# Patient Record
Sex: MALE | Race: WHITE
[De-identification: names, ages, dates, MRNs, and addresses within clinical notes are randomized per-mention and may not be internally consistent; named-entity substitution may affect disease eponyms.]

---

## 2021-11-29 ENCOUNTER — HOSPITAL ENCOUNTER (EMERGENCY)
Dept: HOSPITAL 56 - MW.ED | Age: 36
Discharge: HOME | End: 2021-11-29
Payer: SELF-PAY

## 2021-11-29 DIAGNOSIS — K29.00: Primary | ICD-10-CM

## 2021-11-29 DIAGNOSIS — Z20.822: ICD-10-CM

## 2021-11-29 DIAGNOSIS — R12: ICD-10-CM

## 2021-11-29 DIAGNOSIS — R11.2: ICD-10-CM

## 2021-11-29 DIAGNOSIS — R19.7: ICD-10-CM

## 2021-11-29 LAB
BUN SERPL-MCNC: 16 MG/DL (ref 7–18)
CHLORIDE SERPL-SCNC: 97 MMOL/L (ref 98–107)
CO2 SERPL-SCNC: 29.7 MMOL/L (ref 21–32)
FLUAV RNA UPPER RESP QL NAA+PROBE: NEGATIVE
FLUBV RNA UPPER RESP QL NAA+PROBE: NEGATIVE
GLUCOSE SERPL-MCNC: 147 MG/DL (ref 74–106)
POTASSIUM SERPL-SCNC: 3.5 MMOL/L (ref 3.5–5.1)
SARS-COV-2 RNA RESP QL NAA+PROBE: NEGATIVE
SODIUM SERPL-SCNC: 138 MMOL/L (ref 136–148)

## 2021-11-29 PROCEDURE — 96375 TX/PRO/DX INJ NEW DRUG ADDON: CPT

## 2021-11-29 PROCEDURE — C9113 INJ PANTOPRAZOLE SODIUM, VIA: HCPCS

## 2021-11-29 PROCEDURE — 96374 THER/PROPH/DIAG INJ IV PUSH: CPT

## 2021-11-29 PROCEDURE — 80053 COMPREHEN METABOLIC PANEL: CPT

## 2021-11-29 PROCEDURE — 96376 TX/PRO/DX INJ SAME DRUG ADON: CPT

## 2021-11-29 PROCEDURE — 36415 COLL VENOUS BLD VENIPUNCTURE: CPT

## 2021-11-29 PROCEDURE — 93005 ELECTROCARDIOGRAM TRACING: CPT

## 2021-11-29 PROCEDURE — 74176 CT ABD & PELVIS W/O CONTRAST: CPT

## 2021-11-29 PROCEDURE — 84484 ASSAY OF TROPONIN QUANT: CPT

## 2021-11-29 PROCEDURE — 81001 URINALYSIS AUTO W/SCOPE: CPT

## 2021-11-29 PROCEDURE — 0240U: CPT

## 2021-11-29 PROCEDURE — 83735 ASSAY OF MAGNESIUM: CPT

## 2021-11-29 PROCEDURE — 99284 EMERGENCY DEPT VISIT MOD MDM: CPT

## 2021-11-29 PROCEDURE — 85025 COMPLETE CBC W/AUTO DIFF WBC: CPT

## 2021-11-29 NOTE — CT
HISTORY:



Abdominal pain.



TECHNIQUE:



CT abdomen and pelvis without contrast.



COMPARISON:



None.



FINDINGS:



Abdomen: Liver is enlarged. Pancreas, spleen, and adrenal glands are 

unremarkable. No urinary tract calculi or hydronephrosis. No dilated bowel. 

Appendix is unremarkable. No free fluid. No lymphadenopathy. Abdominal 

aorta is not dilated.



Pelvis: No lymphadenopathy.



Musculoskeletal: Arthrosis of the left sacroiliac joint with erosions and 

subarticular sclerosis. No ankylosis. No significant arthrosis at the right 

sacroiliac joint. No ankylosis in the spine. 



Lower chest: Unremarkable.



IMPRESSION:



1. Hepatomegaly. 



2. No acute abnormality in abdomen or pelvis otherwise. 



3. 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



Please note that all CT scans at this facility use dose modulation, 

iterative reconstruction, and/or weight-based dosing when appropriate to 

reduce radiation dose to as low as reasonably achievable.



Dictated by Abebe Lee MD @ 11/29/2021 9:46:46 PM



(Electronically Signed)

## 2021-11-29 NOTE — PCM.EKG
** #1 Interpretation


EKG Interpretation Comments: 





EKG done 11/29/2021 at 10:16 PM shows a sinus rhythm heart rate 93  QT 

duration 467 Axis 48 normal QRS normal ST and T no prior for comparison 

impression normal

## 2021-11-29 NOTE — EDM.PDOC
ED HPI GENERAL MEDICAL PROBLEM





- General


Chief Complaint: Abdominal Pain


Stated Complaint: VOMITTING, THROAT SWELLING


Time Seen by Provider: 11/29/21 19:45


Source of Information: Reports: Patient


History Limitations: Reports: No Limitations





- History of Present Illness


INITIAL COMMENTS - FREE TEXT/NARRATIVE: 





HISTORY AND PHYSICAL:





History of present illness:


The patient is a 36-year-old male who presents to the emergency department with 

complaints of vomiting approximately 15 times today, sore throat with the 

sensation of throat closing.  The patient states that he has had diarrhea for 

approximately 2 days.  The patient states that he has some raw feeling in his 

mid abdomen.  Patient denies any fever, chills, headache, change in vision, 

syncope or near syncope. Denies any chest pain, back pain, shortness of breath 

or cough. Denies any constipation or dysuria. Has not noted any blood in urine 

or stool. Patient has been eating and drinking appropriately.





The patient is not Covid vaccinated.





Review of systems: 


As per history of present illness and below otherwise all systems reviewed and 

negative.





Past medical history: 


As per history of present illness and as reviewed below otherwise 

noncontributory.





Surgical history: 


As per history of present illness and as reviewed below otherwise 

noncontributory.





Social history: 


See social history for further information





Family history: 


As per history of present illness and as reviewed below otherwise 

noncontributory.





Physical exam:


General: Well developed and well nourished. Alert and orientated x 3. Nontoxic 

in appearance and in no acute distress. Vital signs are stable and have been 

reviewed by me. Nursing notes were reviewed. 


HEENT: Atraumatic, normocephalic, pupils equal and reactive bilaterally, ne

gative for conjunctival pallor or scleral icterus, mucous membranes moist, TMs 

normal bilaterally, throat clear, neck supple, nontender, trachea midline. No 

drooling or trismus noted. No meningeal signs. No hot potato voice noted. 


Lungs: Clear to auscultation bilaterally. No wheezes, rales, or rhonchi.   Chest

nontender. Normal work of breathing, no accessory muscles used.


Heart: S1S2, regular rate and rhythm without overt murmur, gallops, or rubs. No 

JVD. No peripheral edema


Abdomen: Soft, nondistended, nontender. Normoactive bowel sounds. Negative for 

masses or costovertebral tenderness.


Skin: Intact, warm, dry. No lesions or rashes noted.


Hematologic: No petechiae or purpra. Mucosa appropriate color and normal nail 

bed color and refill.


Extremities: Atraumatic, moves all extremities per self without difficulty or 

deficits, negative for cords or calf pain. Neurovascular unremarkable.


Neuro: Awake, alert, oriented. Cranial nerves II through XII unremarkable. 

Cerebellum unremarkable. Motor and sensory unremarkable throughout. Exam 

nonfocal.


Psychiatric: Mood and affect are appropriate.  Normal thought process. Answering

questions appropriately.





Notes:


*This patient was seen and evaluated during the 2020 SARS-CoV-2 novel 

coronavirus pandemic period.  Community viral transmission is ongoing at time of

this encounter and the emergency department is operating under pandemic response

procedures.





As stated above, the patient is a 36-year-old male who presents to the emergency

room with complaints of vomiting approximately 15 times today and afterwards his

throat is now very irritated to where it feels like it is closing.  The patient 

is able to swallow and has no stridor.  The patient did state that he had 

diarrhea for the last 2 days.  The patient denies any fever.  I will do 

abdominal work-up avoiding a CT unless the lab work states it.  This is most 

likely a viral illness.  We also did a Covid 19 swab.





The patient's CBC is remarkable for a hemoglobin of 17.7.  The patient's 

chemistry is significant for a chloride of 97, creatinine 1.4, glucose 147, 

calcium 8.3.  His AST is 78 and ALT is 88.





The patient is still having abdominal distress but his nausea is much better.  I

will go ahead and order an abdominal/pelvis CT.  I will also order viscous 

lidocaine for the patient's throat as this is causing him discomfort.  The 

patient is agreeable with this plan.





The patient's urine is negative for infection.  Abdomen/Pelvis CT IMPRESSION: 1.

Hepatomegaly. 2. No acute abnormality in abdomen or pelvis otherwise. 3. Erosive

arthropathy at the left sacroiliac joint. The patient is feeling somewhat better

however he is now starting to have heartburn. An EKG which Dr. Lowry read as 

normal. I will treat the patient's heartburn with Mylanta, pantoprazole IV push,

and another dose of Zofran. After talking with patient he states that he is a 

heavy drinker but has not drank today due to using vomiting. He states that he 

normally requires Tums but not on a daily basis. I think this is mostly 

irritation from the vomiting and we'll treated as such the patient is agreeable 

with this plan.





I have talked with the patient about today's findings, in addition to providing 

specific details for plan of care.  Reassessment at the time of disposition 

demonstrates that the patient is in no acute distress.  The patient is stable 

for discharge, counseling was provided and we discussed in great detail signs 

and symptoms that would prompt them to return to the Emergency Department. 

Medication, follow up and supportive care measures were reviewed and discussed. 

Voices understanding and is agreeable to plan of care. Denies any further 

questions or concerns at this time.





Diagnostics: CBC, CMP, magnesium, UA, Covid 19





Therapeutics: V fluids, Zofran, Toradol, viscous lidocaine





Prescription: Pantoprazole 20 mg p.o. twice a day to take p.o. before meals for 

10 days, Zofran 4 mg ODT 1 every 6 hours #10





Impression: Vomiting, diarrhea, heartburn





Plan:


1.  You were evaluated today on an emergent basis. Your complaints of nausea, 

vomiting, and diarrhea was evaluated with blood work. Your blood work showed a 

slightly elevated creatinine of 1.4, and as we talked about this is just a 

slight elevation nothing to worry about. Your abdomen/pelvis CT did not show any

acute abnormality however you do have an enlarged liver along with elevated 

liver enzymes that you need to follow-up with your primary care about. I do 

think part of the irritation burning is from drinking daily and then going 

through this vomiting process. We treated you with viscous lidocaine, Mylanta, 

and Zofran. I also gave you IV pantoprazole which will help with the heartburn. 

I'm prescribing you oral pantoprazole twice a day for 10 days Zofran 4 mg ODT 1 

every 6 hours for nausea, which were sent to [facility] pharmacy.. Then take 

Pepcid 20 mg once or twice a day to control your heartburn.  You might need to 

supplement with your Tums as needed.  Your urine was negative for an infection.


2.  You can alternate Tylenol and ibuprofen as needed for pain and fever 

management.


3. We encourage you to follow up with your primary care provider and/or 

recommended specialist in the next few days for re-evaluation and further 

care/management. 


4. If your symptoms should worsen, new symptoms develop or any of the signs and 

symptoms we discussed should arise please return to the emergency room or call 

911 (if needed).





Definitive disposition and diagnosis as appropriate pending reevaluation and 

review of above.





  ** Abdominal


Pain Score (Numeric/FACES): 4





- Related Data


                                    Allergies











Allergy/AdvReac Type Severity Reaction Status Date / Time


 


No Known Allergies Allergy   Verified 11/29/21 19:35











Home Meds: 


                                    Home Meds





Ondansetron [Zofran ODT] 4 mg PO Q6H PRN #10 tab.dis 11/29/21 [Rx]


Pantoprazole 20 mg PO BIDAC 20 Days #40 tab.dr 11/29/21 [Rx]











Social & Family History





- Tobacco Use


Second Hand Smoke Exposure: No





- Caffeine Use


Caffeine Use: Reports: None





- Recreational Drug Use


Recreational Drug Use: No





ED ROS GENERAL





- Review of Systems


Review Of Systems: Comprehensive ROS is negative, except as noted in HPI.





ED EXAM, GI/ABD





- Physical Exam


Exam: See Below (See dictation)





Course





- Vital Signs


Last Recorded V/S: 


                                Last Vital Signs











Temp  96.8 F L  11/29/21 19:31


 


Pulse  85   11/29/21 21:17


 


Resp  16   11/29/21 21:17


 


BP  151/112 H  11/29/21 21:17


 


Pulse Ox  97   11/29/21 21:17














- Orders/Labs/Meds


Orders: 


                               Active Orders 24 hr











 Category Date Time Status


 


 Sodium Chloride 0.9% [Saline Flush] Med  11/29/21 19:44 Active





 10 ml FLUSH ASDIRECTED PRN   


 


 Sodium Chloride 0.9% [Saline Flush] Med  11/29/21 19:44 Active





 2.5 ml FLUSH ASDIRECTED PRN   


 


 Saline Lock Insert [OM.PC] Stat Oth  11/29/21 19:44 Ordered








                                Medication Orders





Sodium Chloride (Sodium Chloride 0.9% 10 Ml Syringe)  10 ml FLUSH ASDIRECTED PRN


   PRN Reason: Keep Vein Open


   Last Admin: 11/29/21 19:57  Dose: 10 ml


   Documented by: CLEVE


Sodium Chloride (Sodium Chloride 0.9% 2.5 Ml Syringe)  2.5 ml FLUSH ASDIRECTED 

PRN


   PRN Reason: Keep Vein Open


   Last Admin: 11/29/21 19:57  Dose: 2.5 ml


   Documented by: SALAJER








Labs: 


                                Laboratory Tests











  11/29/21 11/29/21 11/29/21 Range/Units





  19:39 19:40 19:40 


 


WBC   4.97   (4.0-11.0)  K/uL


 


RBC   5.42   (4.50-5.90)  M/uL


 


Hgb   17.7 H   (13.0-17.0)  g/dL


 


Hct   47.9   (38.0-50.0)  %


 


MCV   88.4   (80.0-98.0)  fL


 


MCH   32.7 H   (27.0-32.0)  pg


 


MCHC   37.0   (31.0-37.0)  g/dL


 


RDW Std Deviation   40.0   (28.0-62.0)  fl


 


RDW Coeff of Fawad   13   (11.0-15.0)  %


 


Plt Count   323   (150-400)  K/uL


 


MPV   10.20   (7.40-12.00)  fL


 


Neut % (Auto)   50.3   (48.0-80.0)  %


 


Lymph % (Auto)   34.8   (16.0-40.0)  %


 


Mono % (Auto)   14.7   (0.0-15.0)  %


 


Eos % (Auto)   0.0   (0.0-7.0)  %


 


Baso % (Auto)   0.2   (0.0-1.5)  %


 


Neut # (Auto)   2.5   (1.4-5.7)  K/uL


 


Lymph # (Auto)   1.7   (0.6-2.4)  K/uL


 


Mono # (Auto)   0.7   (0.0-0.8)  K/uL


 


Eos # (Auto)   0.0   (0.0-0.7)  K/uL


 


Baso # (Auto)   0.0   (0.0-0.1)  K/uL


 


Nucleated RBC %   0.0   /100WBC


 


Nucleated RBCs #   0   K/uL


 


Sodium    138  (136-148)  mmol/L


 


Potassium    3.5  (3.5-5.1)  mmol/L


 


Chloride    97 L  ()  mmol/L


 


Carbon Dioxide    29.7  (21.0-32.0)  mmol/L


 


BUN    16  (7.0-18.0)  mg/dL


 


Creatinine    1.4 H  (0.8-1.3)  mg/dL


 


Est Cr Clr Drug Dosing    73.65  mL/min


 


Estimated GFR (MDRD)    57.7  ml/min


 


Glucose    147 H  ()  mg/dL


 


Calcium    8.3 L  (8.5-10.1)  mg/dL


 


Magnesium    2.2  (1.8-2.4)  mg/dL


 


Total Bilirubin    0.6  (0.2-1.0)  mg/dL


 


AST    78 H  (15-37)  IU/L


 


ALT    88 H  (14-63)  IU/L


 


Alkaline Phosphatase    73  ()  U/L


 


Troponin I     (0.000-0.056)  ng/mL


 


Total Protein    8.0  (6.4-8.2)  g/dL


 


Albumin    4.0  (3.4-5.0)  g/dL


 


Globulin    4.0  (2.6-4.0)  g/dL


 


Albumin/Globulin Ratio    1.0  (0.9-1.6)  


 


Urine Color     


 


Urine Appearance     


 


Urine pH     (5.0-8.0)  


 


Ur Specific Gravity     (1.001-1.035)  


 


Urine Protein     (NEGATIVE)  mg/dL


 


Urine Glucose (UA)     (NEGATIVE)  mg/dL


 


Urine Ketones     (NEGATIVE)  mg/dL


 


Urine Occult Blood     (NEGATIVE)  


 


Urine Nitrite     (NEGATIVE)  


 


Urine Bilirubin     (NEGATIVE)  


 


Urine Urobilinogen     (<2.0)  EU/dL


 


Ur Leukocyte Esterase     (NEGATIVE)  


 


Urine RBC     (0-2/HPF)  


 


Urine WBC     (0-5/HPF)  


 


Ur Epithelial Cells     (NONE-FEW)  


 


Urine Bacteria     (NEGATIVE)  


 


Urine Mucus     (NONE-MOD)  


 


Influenza Type A RNA  NEGATIVE    (NEGATIVE)  


 


Influenza Type B RNA  NEGATIVE    (NEGATIVE)  


 


SARS-CoV-2 RNA (QUINN)  NEGATIVE    (NEGATIVE)  














  11/29/21 11/29/21 Range/Units





  19:40 20:20 


 


WBC    (4.0-11.0)  K/uL


 


RBC    (4.50-5.90)  M/uL


 


Hgb    (13.0-17.0)  g/dL


 


Hct    (38.0-50.0)  %


 


MCV    (80.0-98.0)  fL


 


MCH    (27.0-32.0)  pg


 


MCHC    (31.0-37.0)  g/dL


 


RDW Std Deviation    (28.0-62.0)  fl


 


RDW Coeff of Fawad    (11.0-15.0)  %


 


Plt Count    (150-400)  K/uL


 


MPV    (7.40-12.00)  fL


 


Neut % (Auto)    (48.0-80.0)  %


 


Lymph % (Auto)    (16.0-40.0)  %


 


Mono % (Auto)    (0.0-15.0)  %


 


Eos % (Auto)    (0.0-7.0)  %


 


Baso % (Auto)    (0.0-1.5)  %


 


Neut # (Auto)    (1.4-5.7)  K/uL


 


Lymph # (Auto)    (0.6-2.4)  K/uL


 


Mono # (Auto)    (0.0-0.8)  K/uL


 


Eos # (Auto)    (0.0-0.7)  K/uL


 


Baso # (Auto)    (0.0-0.1)  K/uL


 


Nucleated RBC %    /100WBC


 


Nucleated RBCs #    K/uL


 


Sodium    (136-148)  mmol/L


 


Potassium    (3.5-5.1)  mmol/L


 


Chloride    ()  mmol/L


 


Carbon Dioxide    (21.0-32.0)  mmol/L


 


BUN    (7.0-18.0)  mg/dL


 


Creatinine    (0.8-1.3)  mg/dL


 


Est Cr Clr Drug Dosing    mL/min


 


Estimated GFR (MDRD)    ml/min


 


Glucose    ()  mg/dL


 


Calcium    (8.5-10.1)  mg/dL


 


Magnesium    (1.8-2.4)  mg/dL


 


Total Bilirubin    (0.2-1.0)  mg/dL


 


AST    (15-37)  IU/L


 


ALT    (14-63)  IU/L


 


Alkaline Phosphatase    ()  U/L


 


Troponin I  < 0.050   (0.000-0.056)  ng/mL


 


Total Protein    (6.4-8.2)  g/dL


 


Albumin    (3.4-5.0)  g/dL


 


Globulin    (2.6-4.0)  g/dL


 


Albumin/Globulin Ratio    (0.9-1.6)  


 


Urine Color   DARK YELLOW  


 


Urine Appearance   CLEAR  


 


Urine pH   8.5 H  (5.0-8.0)  


 


Ur Specific Gravity   1.010  (1.001-1.035)  


 


Urine Protein   TRACE H  (NEGATIVE)  mg/dL


 


Urine Glucose (UA)   NEGATIVE  (NEGATIVE)  mg/dL


 


Urine Ketones   NEGATIVE  (NEGATIVE)  mg/dL


 


Urine Occult Blood   NEGATIVE  (NEGATIVE)  


 


Urine Nitrite   NEGATIVE  (NEGATIVE)  


 


Urine Bilirubin   SMALL H  (NEGATIVE)  


 


Urine Urobilinogen   1.0  (<2.0)  EU/dL


 


Ur Leukocyte Esterase   NEGATIVE  (NEGATIVE)  


 


Urine RBC   0-1  (0-2/HPF)  


 


Urine WBC   0-1  (0-5/HPF)  


 


Ur Epithelial Cells   RARE  (NONE-FEW)  


 


Urine Bacteria   RARE  (NEGATIVE)  


 


Urine Mucus   LIGHT  (NONE-MOD)  


 


Influenza Type A RNA    (NEGATIVE)  


 


Influenza Type B RNA    (NEGATIVE)  


 


SARS-CoV-2 RNA (QUINN)    (NEGATIVE)  











Meds: 


Medications











Generic Name Dose Route Start Last Admin





  Trade Name Freq  PRN Reason Stop Dose Admin


 


Sodium Chloride  10 ml  11/29/21 19:44  11/29/21 19:57





  Sodium Chloride 0.9% 10 Ml Syringe  FLUSH   10 ml





  ASDIRECTED PRN   Administration





  Keep Vein Open  


 


Sodium Chloride  2.5 ml  11/29/21 19:44  11/29/21 19:57





  Sodium Chloride 0.9% 2.5 Ml Syringe  FLUSH   2.5 ml





  ASDIRECTED PRN   Administration





  Keep Vein Open  














Discontinued Medications














Generic Name Dose Route Start Last Admin





  Trade Name Freq  PRN Reason Stop Dose Admin


 


Sodium Chloride  1,000 mls @ 999 mls/hr  11/29/21 19:45  11/29/21 19:58





  Normal Saline  IV  11/29/21 20:45  999 mls/hr





  .BOLUS ONE   Administration


 


Sodium Chloride  1,000 mls @ 999 mls/hr  11/29/21 21:22  11/29/21 22:32





  Normal Saline  IV  11/29/21 22:22  999 mls/hr





  .BOLUS ONE   Administration


 


Ketorolac Tromethamine  30 mg  11/29/21 19:45  11/29/21 19:58





  Ketorolac 30 Mg/Ml Sdv  IVPUSH  11/29/21 19:46  30 mg





  ONETIME ONE   Administration


 


Lidocaine HCl  15 ml  11/29/21 20:39  11/29/21 21:07





  Lidocaine 2% Viscous Solution 15 Ml Cup  PO  11/29/21 20:40  15 ml





  ONETIME ONE   Administration


 


Ondansetron HCl  4 mg  11/29/21 19:45  11/29/21 19:58





  Ondansetron 4 Mg/2 Ml Sdv  IVPUSH  11/29/21 19:46  4 mg





  ONETIME ONE   Administration


 


Ondansetron HCl  4 mg  11/29/21 22:12  11/29/21 22:31





  Ondansetron 4 Mg/2 Ml Sdv  IVPUSH  11/29/21 22:13  4 mg





  ONETIME ONE   Administration


 


Pantoprazole Sodium  40 mg  11/29/21 22:13  11/29/21 22:32





  Pantoprazole 40 Mg/10 Ml Syringe  IVPUSH  11/29/21 22:14  40 mg





  NOW ONE   Administration














Departure





- Departure


Time of Disposition: 23:04


Disposition: Home, Self-Care 01


Condition: Good


Clinical Impression: 


Abdominal pain


Qualifiers:


 Abdominal location: generalized Qualified Code(s): R10.84 - Generalized 

abdominal pain





Vomiting


Qualifiers:


 Vomiting type: unspecified Vomiting Intractability: unspecified Nausea 

presence: with nausea Qualified Code(s): R11.2 - Nausea with vomiting, 

unspecified





Gastritis


Qualifiers:


 Gastritis type: unspecified gastritis Chronicity: acute Gastritis bleeding: 

without bleeding Qualified Code(s): K29.00 - Acute gastritis without bleeding








- Discharge Information


Prescriptions: 


Pantoprazole 20 mg PO BIDAC 20 Days #40 tab.dr


Ondansetron [Zofran ODT] 4 mg PO Q6H PRN #10 tab.dis


 PRN Reason: Nausea


Instructions:  Gastritis, Adult, Easy-to-Read, Nausea and Vomiting, Adult


Referrals: 


PCP,None [Primary Care Provider] - 


Forms:  ED Department Discharge


Additional Instructions: 


The following information is given to patients seen in the emergency department 

who are being discharged to home. This information is to outline your options 

for follow-up care. We provide all patients seen in our emergency department 

with a follow-up referral.





The need for follow-up, as well as the timing and circumstances, are variable 

depending upon the specifics of your emergency department visit.





If you don't have a primary care physician on staff, we will provide you with a 

referral. We always advise you to contact your personal physician following an 

emergency department visit to inform them of the circumstance of the visit and 

for follow-up with them and/or the need for any referrals to a consulting 

specialist.





The emergency department will also refer you to a specialist when appropriate. 

This referral assures that you have the opportunity for follow-up care with a 

specialist. All of these measure are taken in an effort to provide you with opti

mal care, which includes your follow-up.





Under all circumstances we always encourage you to contact your private 

physician who remains a resource for coordinating your care. When calling for 

follow-up care, please make the office aware that this follow-up is from your 

recent emergency room visit. If for any reason you are refused follow-up, please

contact the [facility] Emergency Department

at [phone] and asked to speak to the emergency department charge nurse.





[facility]


[address] [place] [address]


Phone: [phone]


Fax: [phone]





[facility]


[address] [place] [address]


Phone: [phone]


Fax: [phone]





Plan:


1.  You were evaluated today on an emergent basis. Your complaints of nausea, 

vomiting, and diarrhea was evaluated with blood work. Your blood work showed a 

slightly elevated creatinine of 1.4, and as we talked about this is just a 

slight elevation nothing to worry about. Your abdomen/pelvis CT did not show any

acute abnormality however you do have an enlarged liver along with elevated 

liver enzymes that you need to follow-up with your primary care about. I do 

think part of the irritation burning is from drinking daily and then going 

through this vomiting process. We treated you with viscous lidocaine, Mylanta, 

and Zofran. I also gave you IV pantoprazole which will help with the heartburn. 

I'm prescribing you oral pantoprazole twice a day for 10 days Zofran 4 mg ODT 1 

every 6 hours for nausea, [facility].. Then take 

Pepcid 20 mg once or twice a day to control your heartburn.  You might need to 

supplement with your Tums as needed.  Your urine was negative for an infection.


2.  You can alternate Tylenol and ibuprofen as needed for pain and fever 

management.


3. We encourage you to follow up with your primary care provider and/or 

recommended specialist in the next few days for re-evaluation and further 

care/management. 


4. If your symptoms should worsen, new symptoms develop or any of the signs and 

symptoms we discussed should arise please return to the emergency room or call 

[phone] (if needed).





Sepsis Event Note (ED)





- Evaluation


Sepsis Screening Result: No Definite Risk





- Focused Exam


Vital Signs: 


                                   Vital Signs











  Temp Pulse Resp BP Pulse Ox


 


 11/29/21 21:17   85  16  151/112 H  97


 


 11/29/21 19:31  96.8 F L  108 H  20  160/117 H  97














- My Orders


Last 24 Hours: 


My Active Orders





11/29/21 19:44


Sodium Chloride 0.9% [Saline Flush]   10 ml FLUSH ASDIRECTED PRN 


Sodium Chloride 0.9% [Saline Flush]   2.5 ml FLUSH ASDIRECTED PRN 


Saline Lock Insert [OM.PC] Stat 














- Assessment/Plan


Last 24 Hours: 


My Active Orders





11/29/21 19:44


Sodium Chloride 0.9% [Saline Flush]   10 ml FLUSH ASDIRECTED PRN 


Sodium Chloride 0.9% [Saline Flush]   2.5 ml FLUSH ASDIRECTED PRN 


Saline Lock Insert [OM.PC] Stat